# Patient Record
Sex: FEMALE
[De-identification: names, ages, dates, MRNs, and addresses within clinical notes are randomized per-mention and may not be internally consistent; named-entity substitution may affect disease eponyms.]

---

## 2020-10-06 PROBLEM — Z00.00 ENCOUNTER FOR PREVENTIVE HEALTH EXAMINATION: Status: ACTIVE | Noted: 2020-10-06

## 2020-10-13 ENCOUNTER — APPOINTMENT (OUTPATIENT)
Dept: COLORECTAL SURGERY | Facility: CLINIC | Age: 55
End: 2020-10-13
Payer: COMMERCIAL

## 2020-10-13 VITALS
DIASTOLIC BLOOD PRESSURE: 86 MMHG | TEMPERATURE: 97.2 F | HEIGHT: 66 IN | WEIGHT: 135 LBS | HEART RATE: 87 BPM | SYSTOLIC BLOOD PRESSURE: 129 MMHG | RESPIRATION RATE: 16 BRPM | BODY MASS INDEX: 21.69 KG/M2

## 2020-10-13 DIAGNOSIS — Z78.9 OTHER SPECIFIED HEALTH STATUS: ICD-10-CM

## 2020-10-13 DIAGNOSIS — Z80.0 FAMILY HISTORY OF MALIGNANT NEOPLASM OF DIGESTIVE ORGANS: ICD-10-CM

## 2020-10-13 DIAGNOSIS — K51.90 ULCERATIVE COLITIS, UNSPECIFIED, W/OUT COMPLICATIONS: ICD-10-CM

## 2020-10-13 DIAGNOSIS — R19.4 CHANGE IN BOWEL HABIT: ICD-10-CM

## 2020-10-13 PROCEDURE — 99203 OFFICE O/P NEW LOW 30 MIN: CPT

## 2020-10-13 NOTE — REVIEW OF SYSTEMS
[Feeling Poorly] : feeling poorly [As Noted in HPI] : as noted in HPI [Abdominal Pain] : abdominal pain [Diarrhea] : diarrhea [Negative] : Heme/Lymph [FreeTextEntry7] : bleeding

## 2020-10-13 NOTE — ASSESSMENT
[FreeTextEntry1] : 55-year-old female with ulcerative colitis and recent re exacerbation episodes also has changes in bowel habits with increasing thinner caliber stools and bleeding.  Recommend colonoscopy. Risks and benefits of colonoscopy have been discussed which include but not limited to bleeding, perforation, missing a cancer or polyp occurring 5%.\par

## 2020-10-13 NOTE — HISTORY OF PRESENT ILLNESS
[FreeTextEntry1] : Consultation requested by Dr. Anthony Ashford for  ulcerative colitis and changes in bowel habits. 55-year-old female who initially developed ulcerative colitis during her pregnancy 20 years ago has had recent changes in bowel habits with smaller caliber stools has also had recent episodes of active colitis with abdominal pain diarrhea and bleeding.Symptoms have been worsening

## 2020-10-13 NOTE — PHYSICAL EXAM
[Abdomen Masses] : No abdominal masses [Abdomen Tenderness] : ~T No ~M abdominal tenderness [Tender] : nontender [JVD] : no jugular venous distention  [Normal Breath Sounds] : Normal breath sounds [Normal Heart Sounds] : normal heart sounds [Normal Rate and Rhythm] : normal rate and rhythm [No Rash or Lesion] : No rash or lesion [Alert] : alert [Oriented to Person] : oriented to person [Oriented to Place] : oriented to place [Oriented to Time] : oriented to time [Calm] : calm [de-identified] : Looks well in no distress, of stated age. [de-identified] : pupils equal reactive to light normocephalic atraumatic. [de-identified] : moves all 4 extremities appropriately with 5 over 5 strength

## 2020-10-13 NOTE — CONSULT LETTER
[Dear  ___] : Dear  [unfilled], [Consult Letter:] : I had the pleasure of evaluating your patient, [unfilled]. [Please see my note below.] : Please see my note below. [Consult Closing:] : Thank you very much for allowing me to participate in the care of this patient.  If you have any questions, please do not hesitate to contact me. [Sincerely,] : Sincerely, [FreeTextEntry3] : Bill Sewell M.D. FACS, FASCRS

## 2021-01-15 DIAGNOSIS — R19.7 DIARRHEA, UNSPECIFIED: ICD-10-CM

## 2021-01-18 ENCOUNTER — TRANSCRIPTION ENCOUNTER (OUTPATIENT)
Age: 56
End: 2021-01-18

## 2021-01-18 ENCOUNTER — APPOINTMENT (OUTPATIENT)
Dept: DISASTER EMERGENCY | Facility: CLINIC | Age: 56
End: 2021-01-18

## 2021-01-18 DIAGNOSIS — Z01.818 ENCOUNTER FOR OTHER PREPROCEDURAL EXAMINATION: ICD-10-CM

## 2021-01-19 ENCOUNTER — NON-APPOINTMENT (OUTPATIENT)
Age: 56
End: 2021-01-19

## 2021-01-19 LAB
C DIFF TOX GENS STL QL NAA+PROBE: NORMAL
CDIFF BY PCR: NOT DETECTED
DEPRECATED O AND P PREP STL: NORMAL
SARS-COV-2 N GENE NPH QL NAA+PROBE: NOT DETECTED

## 2021-01-21 ENCOUNTER — APPOINTMENT (OUTPATIENT)
Dept: COLORECTAL SURGERY | Facility: AMBULATORY MEDICAL SERVICES | Age: 56
End: 2021-01-21
Payer: COMMERCIAL

## 2021-01-21 ENCOUNTER — RESULT REVIEW (OUTPATIENT)
Age: 56
End: 2021-01-21

## 2021-01-21 LAB — BACTERIA STL CULT: ABNORMAL

## 2021-01-21 PROCEDURE — 45380 COLONOSCOPY AND BIOPSY: CPT

## 2021-01-21 RX ORDER — MESALAMINE 800 MG/1
800 TABLET, DELAYED RELEASE ORAL
Qty: 90 | Refills: 3 | Status: ACTIVE | COMMUNITY
Start: 2021-01-21 | End: 1900-01-01

## 2021-02-04 ENCOUNTER — NON-APPOINTMENT (OUTPATIENT)
Age: 56
End: 2021-02-04

## 2022-12-12 ENCOUNTER — OFFICE (OUTPATIENT)
Dept: URBAN - METROPOLITAN AREA CLINIC 102 | Facility: CLINIC | Age: 57
Setting detail: OPHTHALMOLOGY
End: 2022-12-12
Payer: COMMERCIAL

## 2022-12-12 DIAGNOSIS — H20.00: ICD-10-CM

## 2022-12-12 DIAGNOSIS — H43.393: ICD-10-CM

## 2022-12-12 DIAGNOSIS — H25.13: ICD-10-CM

## 2022-12-12 DIAGNOSIS — H52.4: ICD-10-CM

## 2022-12-12 PROBLEM — H52.7 REFRACTIVE ERROR: Status: ACTIVE | Noted: 2022-12-12

## 2022-12-12 PROCEDURE — 92014 COMPRE OPH EXAM EST PT 1/>: CPT | Performed by: OPHTHALMOLOGY

## 2022-12-12 PROCEDURE — 92015 DETERMINE REFRACTIVE STATE: CPT | Performed by: OPHTHALMOLOGY

## 2022-12-12 ASSESSMENT — REFRACTION_AUTOREFRACTION
OD_CYLINDER: -1.75
OD_SPHERE: -1.75
OS_CYLINDER: -1.00
OD_AXIS: 103
OS_SPHERE: -3.00
OS_AXIS: 083

## 2022-12-12 ASSESSMENT — REFRACTION_CURRENTRX
OD_CYLINDER: -1.25
OS_CYLINDER: SPH
OD_AXIS: 104
OS_OVR_VA: 20/
OS_VPRISM_DIRECTION: SV
OS_OVR_VA: 20/
OD_OVR_VA: 20/
OD_VPRISM_DIRECTION: SV
OD_SPHERE: -2.00
OS_SPHERE: -3.50
OD_SPHERE: -2.00
OD_OVR_VA: 20/
OD_AXIS: 106
OD_CYLINDER: -1.25
OS_SPHERE: -3.50

## 2022-12-12 ASSESSMENT — SPHEQUIV_DERIVED
OS_SPHEQUIV: -3.75
OS_SPHEQUIV: -3.5
OD_SPHEQUIV: -2.625
OD_SPHEQUIV: -2.875

## 2022-12-12 ASSESSMENT — KERATOMETRY
OD_K1POWER_DIOPTERS: 42.75
OD_AXISANGLE_DEGREES: 016
OD_K2POWER_DIOPTERS: 44.50
OS_K2POWER_DIOPTERS: 44.25
OS_K1POWER_DIOPTERS: 43.50
OS_AXISANGLE_DEGREES: 179

## 2022-12-12 ASSESSMENT — CONFRONTATIONAL VISUAL FIELD TEST (CVF)
OS_FINDINGS: FULL
OD_FINDINGS: FULL

## 2022-12-12 ASSESSMENT — AXIALLENGTH_DERIVED
OD_AL: 24.7173
OS_AL: 24.9938
OD_AL: 24.6109
OS_AL: 24.885

## 2022-12-12 ASSESSMENT — REFRACTION_MANIFEST
OS_CYLINDER: -0.50
OD_CYLINDER: -1.75
OD_AXIS: 105
OD_ADD: +2.25
OS_AXIS: 075
OD_VA1: 20/20
OS_SPHERE: -3.50
OD_SPHERE: -2.00
OS_VA1: 20/20
OS_ADD: +2.25

## 2022-12-12 ASSESSMENT — TONOMETRY
OD_IOP_MMHG: 12
OS_IOP_MMHG: 11

## 2022-12-12 ASSESSMENT — VISUAL ACUITY
OS_BCVA: 20/20
OD_BCVA: 20/25

## 2023-12-21 ENCOUNTER — OFFICE (OUTPATIENT)
Dept: URBAN - METROPOLITAN AREA CLINIC 102 | Facility: CLINIC | Age: 58
Setting detail: OPHTHALMOLOGY
End: 2023-12-21
Payer: COMMERCIAL

## 2023-12-21 DIAGNOSIS — H25.13: ICD-10-CM

## 2023-12-21 DIAGNOSIS — H43.393: ICD-10-CM

## 2023-12-21 DIAGNOSIS — H20.00: ICD-10-CM

## 2023-12-21 PROCEDURE — 92014 COMPRE OPH EXAM EST PT 1/>: CPT | Performed by: OPHTHALMOLOGY

## 2023-12-21 PROCEDURE — 92020 GONIOSCOPY: CPT | Performed by: OPHTHALMOLOGY

## 2023-12-21 ASSESSMENT — REFRACTION_CURRENTRX
OD_AXIS: 106
OS_OVR_VA: 20/
OS_CYLINDER: SPH
OD_OVR_VA: 20/
OD_OVR_VA: 20/
OS_SPHERE: -3.50
OD_CYLINDER: -1.25
OS_SPHERE: -3.50
OD_VPRISM_DIRECTION: SV
OS_VPRISM_DIRECTION: SV
OD_AXIS: 104
OD_SPHERE: -2.00
OS_OVR_VA: 20/
OD_CYLINDER: -1.25
OD_SPHERE: -2.00

## 2023-12-21 ASSESSMENT — REFRACTION_MANIFEST
OD_CYLINDER: -1.75
OD_SPHERE: -2.00
OD_VA1: 20/20
OS_SPHERE: -3.50
OD_ADD: +2.25
OS_CYLINDER: -0.50
OS_AXIS: 075
OD_AXIS: 105
OS_ADD: +2.25
OS_VA1: 20/20

## 2023-12-21 ASSESSMENT — CONFRONTATIONAL VISUAL FIELD TEST (CVF)
OD_FINDINGS: FULL
OS_FINDINGS: FULL

## 2023-12-21 ASSESSMENT — REFRACTION_AUTOREFRACTION
OD_SPHERE: -2.00
OS_AXIS: 086
OS_SPHERE: -3.50
OD_AXIS: 111
OD_CYLINDER: -1.75
OS_CYLINDER: -0.75

## 2023-12-21 ASSESSMENT — SPHEQUIV_DERIVED
OD_SPHEQUIV: -2.875
OD_SPHEQUIV: -2.875
OS_SPHEQUIV: -3.75
OS_SPHEQUIV: -3.875

## 2024-02-01 ENCOUNTER — NON-APPOINTMENT (OUTPATIENT)
Age: 59
End: 2024-02-01

## 2024-02-21 ENCOUNTER — NON-APPOINTMENT (OUTPATIENT)
Age: 59
End: 2024-02-21

## 2024-02-22 ENCOUNTER — APPOINTMENT (OUTPATIENT)
Dept: OTOLARYNGOLOGY | Facility: CLINIC | Age: 59
End: 2024-02-22
Payer: COMMERCIAL

## 2024-02-22 VITALS — WEIGHT: 135 LBS | BODY MASS INDEX: 21.69 KG/M2 | HEIGHT: 66 IN | TEMPERATURE: 97 F

## 2024-02-22 DIAGNOSIS — Z82.2 FAMILY HISTORY OF DEAFNESS AND HEARING LOSS: ICD-10-CM

## 2024-02-22 DIAGNOSIS — H69.93 UNSPECIFIED EUSTACHIAN TUBE DISORDER, BILATERAL: ICD-10-CM

## 2024-02-22 DIAGNOSIS — H61.23 IMPACTED CERUMEN, BILATERAL: ICD-10-CM

## 2024-02-22 DIAGNOSIS — H90.3 SENSORINEURAL HEARING LOSS, BILATERAL: ICD-10-CM

## 2024-02-22 PROCEDURE — 92567 TYMPANOMETRY: CPT

## 2024-02-22 PROCEDURE — G0268 REMOVAL OF IMPACTED WAX MD: CPT

## 2024-02-22 PROCEDURE — 92557 COMPREHENSIVE HEARING TEST: CPT

## 2024-02-22 PROCEDURE — 99203 OFFICE O/P NEW LOW 30 MIN: CPT | Mod: 25

## 2024-02-22 NOTE — DATA REVIEWED
[de-identified] : Mild low frequency and high frequency conductive loss right ear Left ear is within normal limits Type Ad right ear Type A left ear

## 2024-02-22 NOTE — ASSESSMENT
[FreeTextEntry1] : cerumen cleared au audio ad mixed cond loss a/a tymps consider ad hearing aid f/u audio one y

## 2024-08-27 ENCOUNTER — APPOINTMENT (OUTPATIENT)
Dept: OTOLARYNGOLOGY | Facility: CLINIC | Age: 59
End: 2024-08-27

## 2024-10-23 ENCOUNTER — NON-APPOINTMENT (OUTPATIENT)
Age: 59
End: 2024-10-23

## 2024-10-23 ENCOUNTER — APPOINTMENT (OUTPATIENT)
Dept: OTOLARYNGOLOGY | Facility: CLINIC | Age: 59
End: 2024-10-23
Payer: COMMERCIAL

## 2024-10-23 VITALS — HEIGHT: 66 IN | WEIGHT: 135 LBS | BODY MASS INDEX: 21.69 KG/M2

## 2024-10-23 DIAGNOSIS — R09.A2 FOREIGN BODY SENSATION, THROAT: ICD-10-CM

## 2024-10-23 DIAGNOSIS — J31.2 CHRONIC PHARYNGITIS: ICD-10-CM

## 2024-10-23 DIAGNOSIS — Z87.19 PERSONAL HISTORY OF OTHER DISEASES OF THE DIGESTIVE SYSTEM: ICD-10-CM

## 2024-10-23 PROCEDURE — 99213 OFFICE O/P EST LOW 20 MIN: CPT | Mod: 25

## 2024-10-23 PROCEDURE — 31575 DIAGNOSTIC LARYNGOSCOPY: CPT

## 2025-01-06 ENCOUNTER — RX ONLY (RX ONLY)
Age: 60
End: 2025-01-06

## 2025-01-06 ENCOUNTER — OFFICE (OUTPATIENT)
Dept: URBAN - METROPOLITAN AREA CLINIC 102 | Facility: CLINIC | Age: 60
Setting detail: OPHTHALMOLOGY
End: 2025-01-06
Payer: COMMERCIAL

## 2025-01-06 DIAGNOSIS — H43.393: ICD-10-CM

## 2025-01-06 DIAGNOSIS — H25.13: ICD-10-CM

## 2025-01-06 DIAGNOSIS — H20.00: ICD-10-CM

## 2025-01-06 PROCEDURE — 92014 COMPRE OPH EXAM EST PT 1/>: CPT | Performed by: OPHTHALMOLOGY

## 2025-01-06 ASSESSMENT — REFRACTION_CURRENTRX
OD_AXIS: 106
OD_OVR_VA: 20/
OS_OVR_VA: 20/
OS_SPHERE: -3.50
OD_OVR_VA: 20/
OD_CYLINDER: -1.25
OD_CYLINDER: -1.25
OD_SPHERE: -2.00
OD_AXIS: 104
OS_SPHERE: -3.50
OS_OVR_VA: 20/
OD_VPRISM_DIRECTION: SV
OS_VPRISM_DIRECTION: SV
OD_SPHERE: -2.00
OS_CYLINDER: SPH

## 2025-01-06 ASSESSMENT — KERATOMETRY
METHOD_AUTO_MANUAL: AUTO
OD_AXISANGLE_DEGREES: 027
OS_AXISANGLE_DEGREES: 158
OD_K2POWER_DIOPTERS: 44.75
OS_K2POWER_DIOPTERS: 44.50
OD_K1POWER_DIOPTERS: 43.25
OS_K1POWER_DIOPTERS: 43.75

## 2025-01-06 ASSESSMENT — REFRACTION_AUTOREFRACTION
OS_AXIS: 990
OS_CYLINDER: -1.00
OD_AXIS: 114
OD_CYLINDER: -2.25
OD_SPHERE: -2.00
OS_SPHERE: -3.25

## 2025-01-06 ASSESSMENT — REFRACTION_MANIFEST
OS_VA1: 20/20
OD_ADD: +2.25
OS_SPHERE: -3.50
OD_AXIS: 105
OD_SPHERE: -2.00
OS_AXIS: 075
OS_CYLINDER: -0.50
OS_ADD: +2.25
OD_VA1: 20/20
OD_CYLINDER: -1.75

## 2025-01-06 ASSESSMENT — CONFRONTATIONAL VISUAL FIELD TEST (CVF)
OD_FINDINGS: FULL
OS_FINDINGS: FULL

## 2025-01-06 ASSESSMENT — TONOMETRY
OD_IOP_MMHG: 11
OS_IOP_MMHG: 11

## 2025-01-06 ASSESSMENT — VISUAL ACUITY
OD_BCVA: 20/20-1
OS_BCVA: 20/20